# Patient Record
Sex: FEMALE | URBAN - METROPOLITAN AREA
[De-identification: names, ages, dates, MRNs, and addresses within clinical notes are randomized per-mention and may not be internally consistent; named-entity substitution may affect disease eponyms.]

---

## 2024-12-04 ENCOUNTER — APPOINTMENT (OUTPATIENT)
Dept: URBAN - METROPOLITAN AREA CLINIC 245 | Age: 74
Setting detail: DERMATOLOGY
End: 2024-12-04

## 2024-12-04 DIAGNOSIS — L60.8 OTHER NAIL DISORDERS: ICD-10-CM

## 2024-12-04 DIAGNOSIS — L65.9 NONSCARRING HAIR LOSS, UNSPECIFIED: ICD-10-CM

## 2024-12-04 DIAGNOSIS — L259 CONTACT DERMATITIS AND OTHER ECZEMA, UNSPECIFIED CAUSE: ICD-10-CM

## 2024-12-04 PROBLEM — L30.8 OTHER SPECIFIED DERMATITIS: Status: ACTIVE | Noted: 2024-12-04

## 2024-12-04 PROCEDURE — 99203 OFFICE O/P NEW LOW 30 MIN: CPT

## 2024-12-04 PROCEDURE — OTHER DIAGNOSIS COMMENT: OTHER

## 2024-12-04 PROCEDURE — OTHER PRESCRIPTION MEDICATION MANAGEMENT: OTHER

## 2024-12-04 PROCEDURE — OTHER COUNSELING: OTHER

## 2024-12-04 PROCEDURE — OTHER OTC TREATMENT REGIMEN: OTHER

## 2024-12-04 PROCEDURE — OTHER MIPS QUALITY: OTHER

## 2024-12-04 PROCEDURE — OTHER PRESCRIPTION: OTHER

## 2024-12-04 RX ORDER — CLOBETASOL PROPIONATE 0.5 MG/ML
SOLUTION TOPICAL
Qty: 50 | Refills: 3 | Status: ERX | COMMUNITY
Start: 2024-12-04

## 2024-12-04 ASSESSMENT — LOCATION ZONE DERM
LOCATION ZONE: ARM
LOCATION ZONE: FINGERNAIL
LOCATION ZONE: SCALP
LOCATION ZONE: LEG
LOCATION ZONE: TRUNK

## 2024-12-04 ASSESSMENT — LOCATION SIMPLE DESCRIPTION DERM
LOCATION SIMPLE: LEFT FOREARM
LOCATION SIMPLE: RIGHT PRETIBIAL REGION
LOCATION SIMPLE: LEFT INDEX FINGERNAIL
LOCATION SIMPLE: RIGHT FOREARM
LOCATION SIMPLE: LEFT PRETIBIAL REGION
LOCATION SIMPLE: RIGHT INDEX FINGERNAIL
LOCATION SIMPLE: CHEST
LOCATION SIMPLE: SCALP

## 2024-12-04 ASSESSMENT — LOCATION DETAILED DESCRIPTION DERM
LOCATION DETAILED: LEFT INDEX FINGERNAIL
LOCATION DETAILED: RIGHT SUPERIOR PARIETAL SCALP
LOCATION DETAILED: RIGHT PROXIMAL RADIAL DORSAL FOREARM
LOCATION DETAILED: RIGHT INDEX FINGERNAIL
LOCATION DETAILED: RIGHT DISTAL PRETIBIAL REGION
LOCATION DETAILED: MIDDLE STERNUM
LOCATION DETAILED: LEFT DISTAL DORSAL FOREARM
LOCATION DETAILED: LEFT DISTAL PRETIBIAL REGION

## 2024-12-04 NOTE — PROCEDURE: PRESCRIPTION MEDICATION MANAGEMENT
Render In Strict Bullet Format?: No
Detail Level: Zone
Plan: Purchase 2 jars of Cetaphil Cream, dump the bottle of Clobetasol solution and mix into one of the jars of Cetaphil and label it, “medicated” and apply to flared areas only as needed for active areas. Use the second, unmedicated jar of Cetaphil cream to apply to normal/non-flared skin for maintenance.
Initiate Treatment: .\\n- Clobetasol 0.05% solution: mix into a jar of OTC Cetaphil Cream and apply to the AA BID on moist skin PRN for flares.

## 2024-12-04 NOTE — PROCEDURE: DIAGNOSIS COMMENT
Detail Level: Simple
Render Risk Assessment In Note?: no
Comment: No recommendation for adjusting osimertinib administration.  Xerosis is a commonly encountered problem for patient receiving EGFRis and can be managed topically to allow treatment to continue.\\nPatient is receiving treatment with EGFR inhibitor (osimertinib) for lung cancer- she has been taking this medication for 7 months.  A retrospective study of a cohort of approximately 5000 patients receiving an EGFRi showed a diagnosis of rash in roughly 33% of patients.  Further studies have shown that transepidermal water loss and skin pH both increase during EGFRi treatment, and one-third of patients accordingly experience xerosis.  A recent study from 11/2024 specifically investigating osimertinib demonstrated a cutaneous treatment-related adverse effect in 24% of 167 patients.  Xerosis was among the most commonly diagnosed adverse effects, and almost all patients were given a severity grade of 1 or 2.
Comment: The scalp hair grows more slowly, adopts a more fine and brittle quality, and becomes kinky
Comment: Nail changes can affect the entire nail unit, including the nail bed (onycholysis), nail fold (paronychia and pyogenic granuloma–like lesions), and nail matrix (dyspigmentation and brittle nails)

## 2024-12-04 NOTE — PROCEDURE: OTC TREATMENT REGIMEN
Detail Level: Zone
Patient Specific Otc Recommendations (Will Not Stick From Patient To Patient): .\\nContinue: \\n- Nutrafol supplement \\nRecommended: \\n- minoxidil 5% topical to be applied to the scalp twice daily\\n- Cysteine supplement
Patient Specific Otc Recommendations (Will Not Stick From Patient To Patient): Biotin supplement
Patient Specific Otc Recommendations (Will Not Stick From Patient To Patient): Cetaphil Moisturizing Cream applied to freshly moistened skin twice daily